# Patient Record
Sex: FEMALE | URBAN - METROPOLITAN AREA
[De-identification: names, ages, dates, MRNs, and addresses within clinical notes are randomized per-mention and may not be internally consistent; named-entity substitution may affect disease eponyms.]

---

## 2021-11-01 ENCOUNTER — OFFICE VISIT (OUTPATIENT)
Dept: URBAN - METROPOLITAN AREA CLINIC 1 | Facility: CLINIC | Age: 71
End: 2021-11-01
Payer: MEDICARE

## 2021-11-01 DIAGNOSIS — H04.123 TEAR FILM INSUFFICIENCY OF BILATERAL LACRIMAL GLANDS: ICD-10-CM

## 2021-11-01 DIAGNOSIS — H43.391 OTHER VITREOUS OPACITIES, RIGHT EYE: ICD-10-CM

## 2021-11-01 DIAGNOSIS — H25.13 AGE-RELATED NUCLEAR CATARACT, BILATERAL: Primary | ICD-10-CM

## 2021-11-01 DIAGNOSIS — H52.03 HYPERMETROPIA, BILATERAL: ICD-10-CM

## 2021-11-01 PROCEDURE — 92134 CPTRZ OPH DX IMG PST SGM RTA: CPT | Performed by: OPHTHALMOLOGY

## 2021-11-01 PROCEDURE — 99213 OFFICE O/P EST LOW 20 MIN: CPT | Performed by: OPHTHALMOLOGY

## 2021-11-01 ASSESSMENT — VISUAL ACUITY
OS: 20/40
OD: 20/25

## 2021-11-01 ASSESSMENT — INTRAOCULAR PRESSURE
OS: 16
OD: 13

## 2021-11-01 NOTE — IMPRESSION/PLAN
Impression: Tear film insufficiency of bilateral lacrimal glands: H04.123. Plan: Recommend use of high quality artificial tears 3-4x per day prn.

## 2021-11-01 NOTE — IMPRESSION/PLAN
Impression: Other vitreous opacities, right eye: H43.391. Plan: Discussed diagnosis in detail with patient. Discussed signs and symptoms of PVD/floaters. Discussed signs and symptoms of retinal detachment.

## 2022-05-12 ENCOUNTER — OFFICE VISIT (OUTPATIENT)
Dept: URBAN - METROPOLITAN AREA CLINIC 1 | Facility: CLINIC | Age: 72
End: 2022-05-12
Payer: MEDICARE

## 2022-05-12 DIAGNOSIS — H25.13 AGE-RELATED NUCLEAR CATARACT, BILATERAL: Primary | ICD-10-CM

## 2022-05-12 DIAGNOSIS — H04.123 TEAR FILM INSUFFICIENCY OF BILATERAL LACRIMAL GLANDS: ICD-10-CM

## 2022-05-12 PROCEDURE — 92286 ANT SGM IMG I&R SPECLR MIC: CPT | Performed by: OPHTHALMOLOGY

## 2022-05-12 PROCEDURE — 99214 OFFICE O/P EST MOD 30 MIN: CPT | Performed by: OPHTHALMOLOGY

## 2022-05-12 PROCEDURE — 92134 CPTRZ OPH DX IMG PST SGM RTA: CPT | Performed by: OPHTHALMOLOGY

## 2022-05-12 PROCEDURE — 76519 ECHO EXAM OF EYE: CPT | Performed by: OPHTHALMOLOGY

## 2022-05-12 ASSESSMENT — INTRAOCULAR PRESSURE
OS: 13
OD: 15

## 2022-05-12 NOTE — IMPRESSION/PLAN
Impression: Age-related nuclear cataract, bilateral: H25.13. Plan: Plan to perform cataract surgery in the right / left eye: Discussed the risks, benefits, and alternatives of cataract surgery. The patient stated a full understanding and a desire to proceed with the procedure. Biometry ordered for intraocular lens selection. Advised against driving until complete. Reviewed the increased risk of retinal detachment after cataract surgery and reviewed retinal detachment and general warnings and to contact us immediately should any of those develop. 

CHEST/LUNGS CLEARED ON TODAYS VISIT

DR Levon Chery WENT OVER SPECIALTY LENS FOR ASTIGMATISM, PATIENT UNDERSTOOD WILL LET US KNOW

OD 1ST EYE;      LENS; UPGRADE VIVITY TORIC; SURGERY DROPS; PLANO OU
Impression: Tear film insufficiency of bilateral lacrimal glands: H04.123. Plan: Dry eyes account for the patient's complaints. There is no evidence of permanent changes to the cornea. Explained that this condition does not have a cure. The options explained to the patient include topical lubricants. Patient advised not to use eye drops for red eye or allergies. A sample of Refresh given to patient. Patient to use OTC AT's tid to qid ou.
Applied

## 2022-07-19 ENCOUNTER — PROCEDURE (OUTPATIENT)
Dept: URBAN - METROPOLITAN AREA SURGERY 1 | Facility: SURGERY | Age: 72
End: 2022-07-19
Payer: MEDICARE

## 2022-07-19 ENCOUNTER — Encounter (OUTPATIENT)
Dept: URBAN - METROPOLITAN AREA SURGERY 2 | Facility: SURGERY | Age: 72
End: 2022-07-19
Payer: MEDICARE

## 2022-07-19 PROCEDURE — 66984 XCAPSL CTRC RMVL W/O ECP: CPT | Performed by: OPHTHALMOLOGY

## 2022-07-19 PROCEDURE — PR3FY PR3FY: CUSTOM | Performed by: OPHTHALMOLOGY

## 2022-07-20 ENCOUNTER — POST-OPERATIVE VISIT (OUTPATIENT)
Dept: URBAN - METROPOLITAN AREA CLINIC 1 | Facility: CLINIC | Age: 72
End: 2022-07-20
Payer: MEDICARE

## 2022-07-20 DIAGNOSIS — Z48.810 ENCOUNTER FOR SURGICAL AFTERCARE FOLLOWING SURGERY ON THE SENSE ORGANS: Primary | ICD-10-CM

## 2022-07-20 PROCEDURE — 99024 POSTOP FOLLOW-UP VISIT: CPT | Performed by: OPHTHALMOLOGY

## 2022-07-20 ASSESSMENT — INTRAOCULAR PRESSURE: OD: 12

## 2022-07-20 NOTE — IMPRESSION/PLAN
Impression: S/P Cataract Extraction by phacoemulsification with IOL placement; Femto (LenSx); ORA OD - 1 Day. Encounter for surgical aftercare following surgery on a sense organ  Z48.810. Plan: --Continue all meds--Advised patient to use artificial tears for comfort.

## 2022-07-26 ENCOUNTER — POST-OPERATIVE VISIT (OUTPATIENT)
Dept: URBAN - METROPOLITAN AREA CLINIC 3 | Facility: CLINIC | Age: 72
End: 2022-07-26
Payer: MEDICARE

## 2022-07-26 DIAGNOSIS — Z48.810 ENCOUNTER FOR SURGICAL AFTERCARE FOLLOWING SURGERY ON A SENSE ORGAN: Primary | ICD-10-CM

## 2022-07-26 ASSESSMENT — INTRAOCULAR PRESSURE
OD: 13
OS: 14

## 2022-07-26 NOTE — IMPRESSION/PLAN
Impression: S/P Cataract Extraction by phacoemulsification with IOL placement; Femto (LenSx); ORA OD - 7 Days. Encounter for surgical aftercare following surgery on a sense organ  Z48.810. Plan: OD healing well. Recommend sunglasses if light sensitivity experienced, while outdoors. Recommend +2.00 OTC reading glasses.  --Continue all meds

## 2022-08-01 ENCOUNTER — Encounter (OUTPATIENT)
Dept: URBAN - METROPOLITAN AREA SURGERY 2 | Facility: SURGERY | Age: 72
End: 2022-08-01
Payer: MEDICARE

## 2022-08-01 PROCEDURE — 66984 XCAPSL CTRC RMVL W/O ECP: CPT | Performed by: OPHTHALMOLOGY

## 2022-08-01 PROCEDURE — PR3FY PR3FY: CUSTOM | Performed by: OPHTHALMOLOGY

## 2022-08-02 ENCOUNTER — PROCEDURE (OUTPATIENT)
Dept: URBAN - METROPOLITAN AREA SURGERY 1 | Facility: SURGERY | Age: 72
End: 2022-08-02
Payer: MEDICARE

## 2022-08-03 ENCOUNTER — POST-OPERATIVE VISIT (OUTPATIENT)
Dept: URBAN - METROPOLITAN AREA CLINIC 1 | Facility: CLINIC | Age: 72
End: 2022-08-03
Payer: MEDICARE

## 2022-08-03 DIAGNOSIS — Z96.1 PRESENCE OF INTRAOCULAR LENS: Primary | ICD-10-CM

## 2022-08-03 PROCEDURE — 99024 POSTOP FOLLOW-UP VISIT: CPT | Performed by: OPHTHALMOLOGY

## 2022-08-03 ASSESSMENT — INTRAOCULAR PRESSURE: OS: 17

## 2022-08-03 NOTE — IMPRESSION/PLAN
Impression: S/P Cataract Extraction by phacoemulsification with IOL placement; Femto (LenSx); ORA OS - 1 Day. Presence of intraocular lens  Z96.1.  Plan: patient is doing well, just dryness to use at's qid ou 

rv 2 weeks --Continue all meds

## 2022-08-17 ENCOUNTER — POST-OPERATIVE VISIT (OUTPATIENT)
Dept: URBAN - METROPOLITAN AREA CLINIC 1 | Facility: CLINIC | Age: 72
End: 2022-08-17
Payer: MEDICARE

## 2022-08-17 DIAGNOSIS — Z96.1 PRESENCE OF INTRAOCULAR LENS: Primary | ICD-10-CM

## 2022-08-17 PROCEDURE — 99024 POSTOP FOLLOW-UP VISIT: CPT | Performed by: OPHTHALMOLOGY

## 2022-08-17 ASSESSMENT — INTRAOCULAR PRESSURE
OD: 12
OS: 15

## 2022-08-17 NOTE — IMPRESSION/PLAN
Impression: S/P Cataract Extraction by phacoemulsification with IOL placement; Femto (LenSx); ORA OS - 15 Days. Presence of intraocular lens  Z96.1. Plan: --Advised patient to use artificial tears PF  for comfort. --Continue Ketorolac 0.5% OS

## 2022-09-08 ENCOUNTER — POST-OPERATIVE VISIT (OUTPATIENT)
Dept: URBAN - METROPOLITAN AREA CLINIC 1 | Facility: CLINIC | Age: 72
End: 2022-09-08
Payer: MEDICARE

## 2022-09-08 DIAGNOSIS — Z96.1 PRESENCE OF INTRAOCULAR LENS: Primary | ICD-10-CM

## 2022-09-08 PROCEDURE — 99024 POSTOP FOLLOW-UP VISIT: CPT | Performed by: OPHTHALMOLOGY

## 2022-09-08 ASSESSMENT — VISUAL ACUITY
OD: 20/25
OS: 20/25

## 2022-09-08 ASSESSMENT — INTRAOCULAR PRESSURE
OS: 11
OD: 14

## 2022-09-08 NOTE — IMPRESSION/PLAN
Impression:  Presence of intraocular lens  Z96.1. Plan: patient is doing very good except on the dryness, she will continue with the at's qid ou  patient advised to use warm compress as well for 10-15 min also can think about seeing Dr. Bandar Ibarra as well.   

rv 6 mos CE no Test

## 2023-03-09 ENCOUNTER — OFFICE VISIT (OUTPATIENT)
Dept: URBAN - METROPOLITAN AREA CLINIC 1 | Facility: CLINIC | Age: 73
End: 2023-03-09
Payer: MEDICARE

## 2023-03-09 DIAGNOSIS — H04.123 TEAR FILM INSUFFICIENCY OF BILATERAL LACRIMAL GLANDS: ICD-10-CM

## 2023-03-09 DIAGNOSIS — H02.886 MGD OF LEFT EYE: ICD-10-CM

## 2023-03-09 DIAGNOSIS — Z96.1 PRESENCE OF INTRAOCULAR LENS: ICD-10-CM

## 2023-03-09 DIAGNOSIS — H02.883 MGD OF RIGHT EYE: ICD-10-CM

## 2023-03-09 DIAGNOSIS — H43.391 OTHER VITREOUS OPACITIES, RIGHT EYE: Primary | ICD-10-CM

## 2023-03-09 PROCEDURE — 99214 OFFICE O/P EST MOD 30 MIN: CPT | Performed by: OPHTHALMOLOGY

## 2023-03-09 RX ORDER — PREDNISOLONE ACETATE 10 MG/ML
1 % SUSPENSION/ DROPS OPHTHALMIC
Qty: 5 | Refills: 1 | Status: ACTIVE
Start: 2023-03-09

## 2023-03-09 ASSESSMENT — INTRAOCULAR PRESSURE
OD: 9
OS: 12

## 2023-03-09 NOTE — IMPRESSION/PLAN
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 Start: 1. Artificial tears QID OU 2. Prednisolone Taper 4-3-2-1 x 1 month 3. warm compresses and lid massages BID OU. Instructions given.

## 2023-03-09 NOTE — IMPRESSION/PLAN
Impression: MGD of right eye: H02.883. 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 Start: 1. Artificial tears QID OU 2. Prednisolone Taper 4-3-2-1 x 1 month 3. warm compresses and lid massages BID OU. Instructions given. 

F/U in 1 month

## 2023-04-12 ENCOUNTER — OFFICE VISIT (OUTPATIENT)
Dept: URBAN - METROPOLITAN AREA CLINIC 1 | Facility: CLINIC | Age: 73
End: 2023-04-12
Payer: MEDICARE

## 2023-04-12 DIAGNOSIS — H16.223 BILATERAL KERATOCONJUNCTIVITIS SICCA, NOT SPECIFIED AS SJOGREN'S: Primary | ICD-10-CM

## 2023-04-12 PROCEDURE — 68761 CLOSE TEAR DUCT OPENING: CPT | Performed by: OPHTHALMOLOGY

## 2023-04-12 PROCEDURE — 99213 OFFICE O/P EST LOW 20 MIN: CPT | Performed by: OPHTHALMOLOGY

## 2023-04-12 ASSESSMENT — INTRAOCULAR PRESSURE
OD: 16
OS: 14

## 2023-04-12 NOTE — IMPRESSION/PLAN
Impression: Bilateral keratoconjunctivitis sicca, not specified as Sjogren's: S16.215. Plan: Dry eyes account for the patient's complaints. There is no evidence of permanent changes to the cornea. Explained condition does not have a cure. The patient failed artificial tear monotherapy. Punctal plugs placed today in office, Lot #[id number]. Patient to continue AT's QID OU, Gel QHS OU and Pred QD OU until bottle is done.

## 2023-04-18 ENCOUNTER — POST-OPERATIVE VISIT (OUTPATIENT)
Dept: URBAN - METROPOLITAN AREA CLINIC 1 | Facility: CLINIC | Age: 73
End: 2023-04-18
Payer: MEDICARE

## 2023-04-18 DIAGNOSIS — H16.223 BILATERAL KERATOCONJUNCTIVITIS SICCA, NOT SPECIFIED AS SJOGREN'S: Primary | ICD-10-CM

## 2023-04-18 PROCEDURE — 99024 POSTOP FOLLOW-UP VISIT: CPT | Performed by: OPHTHALMOLOGY

## 2023-04-18 ASSESSMENT — INTRAOCULAR PRESSURE
OD: 16
OS: 16

## 2023-04-18 NOTE — IMPRESSION/PLAN
Impression: Bilateral keratoconjunctivitis sicca, not specified as Sjogren's: I57.097. Plan: Dry eyes account for the patient's complaints. There is no evidence of permanent changes to the cornea. Explained condition does not have a cure. The patient failed artificial tear monotherapy. Punctal plugs removed OU on today's visit, patient could not tolerate. Patient to use AT's BID OU, Prednisolone QD OU until bottle is done. Patient to do warm compresses/lid massages QD OU for maintenance.

## 2023-10-12 ENCOUNTER — OFFICE VISIT (OUTPATIENT)
Dept: URBAN - METROPOLITAN AREA CLINIC 1 | Facility: CLINIC | Age: 73
End: 2023-10-12
Payer: MEDICARE

## 2023-10-12 DIAGNOSIS — H02.883 MGD OF RIGHT EYE: ICD-10-CM

## 2023-10-12 DIAGNOSIS — H01.00B BLEPHARITIS OF LEFT EYE, UPPER AND LOWER EYELIDS: ICD-10-CM

## 2023-10-12 DIAGNOSIS — H16.223 BILATERAL KERATOCONJUNCTIVITIS SICCA, NOT SPECIFIED AS SJOGREN'S: Primary | ICD-10-CM

## 2023-10-12 DIAGNOSIS — Z96.1 PRESENCE OF INTRAOCULAR LENS: ICD-10-CM

## 2023-10-12 DIAGNOSIS — H01.00A BLEPHARITIS OF RIGHT EYE, UPPER AND LOWER EYELIDS: ICD-10-CM

## 2023-10-12 DIAGNOSIS — H02.886 MGD OF LEFT EYE: ICD-10-CM

## 2023-10-12 PROCEDURE — 99212 OFFICE O/P EST SF 10 MIN: CPT | Performed by: OPHTHALMOLOGY

## 2023-10-12 RX ORDER — PREDNISOLONE ACETATE 10 MG/ML
1 % SUSPENSION/ DROPS OPHTHALMIC
Qty: 5 | Refills: 1 | Status: ACTIVE
Start: 2023-10-12

## 2023-10-12 RX ORDER — PERFLUOROHEXYLOCTANE 1 MG/MG
100 % SOLUTION OPHTHALMIC
Qty: 2.5 | Refills: 3 | Status: ACTIVE
Start: 2023-10-12

## 2023-10-12 ASSESSMENT — INTRAOCULAR PRESSURE
OD: 17
OS: 15

## 2023-11-15 ENCOUNTER — OFFICE VISIT (OUTPATIENT)
Dept: URBAN - METROPOLITAN AREA CLINIC 1 | Facility: CLINIC | Age: 73
End: 2023-11-15
Payer: MEDICARE

## 2023-11-15 DIAGNOSIS — H02.886 MGD OF LEFT EYE: ICD-10-CM

## 2023-11-15 DIAGNOSIS — H02.883 MGD OF RIGHT EYE: ICD-10-CM

## 2023-11-15 DIAGNOSIS — H04.123 TEAR FILM INSUFFICIENCY OF BILATERAL LACRIMAL GLANDS: Primary | ICD-10-CM

## 2023-11-15 PROCEDURE — 99212 OFFICE O/P EST SF 10 MIN: CPT | Performed by: OPHTHALMOLOGY

## 2024-11-07 ENCOUNTER — OFFICE VISIT (OUTPATIENT)
Dept: URBAN - METROPOLITAN AREA CLINIC 1 | Facility: CLINIC | Age: 74
End: 2024-11-07
Payer: MEDICARE

## 2024-11-07 DIAGNOSIS — H01.00A BLEPHARITIS OF RIGHT EYE, UPPER AND LOWER EYELIDS: ICD-10-CM

## 2024-11-07 DIAGNOSIS — B88.0 OTHER ACARIASIS: Primary | ICD-10-CM

## 2024-11-07 DIAGNOSIS — Z96.1 PRESENCE OF INTRAOCULAR LENS: ICD-10-CM

## 2024-11-07 DIAGNOSIS — H04.123 TEAR FILM INSUFFICIENCY OF BILATERAL LACRIMAL GLANDS: ICD-10-CM

## 2024-11-07 DIAGNOSIS — H01.00B BLEPHARITIS OF LEFT EYE, UPPER AND LOWER EYELIDS: ICD-10-CM

## 2024-11-07 DIAGNOSIS — H43.391 OTHER VITREOUS OPACITIES, RIGHT EYE: ICD-10-CM

## 2024-11-07 PROCEDURE — 99213 OFFICE O/P EST LOW 20 MIN: CPT

## 2024-11-07 RX ORDER — LOTILANER OPHTHALMIC SOLUTION 2.5 MG/ML
0.25 % SOLUTION/ DROPS OPHTHALMIC
Qty: 5 | Refills: 1 | Status: INACTIVE
Start: 2024-11-07 | End: 2024-12-18

## 2024-11-07 ASSESSMENT — INTRAOCULAR PRESSURE
OS: 16
OD: 15

## 2024-12-19 ENCOUNTER — OFFICE VISIT (OUTPATIENT)
Dept: URBAN - METROPOLITAN AREA CLINIC 1 | Facility: CLINIC | Age: 74
End: 2024-12-19
Payer: MEDICARE

## 2024-12-19 DIAGNOSIS — H01.00B BLEPHARITIS OF LEFT EYE, UPPER AND LOWER EYELIDS: ICD-10-CM

## 2024-12-19 DIAGNOSIS — H04.123 TEAR FILM INSUFFICIENCY OF BILATERAL LACRIMAL GLANDS: Primary | ICD-10-CM

## 2024-12-19 DIAGNOSIS — H01.00A BLEPHARITIS OF RIGHT EYE, UPPER AND LOWER EYELIDS: ICD-10-CM

## 2024-12-19 PROCEDURE — 99212 OFFICE O/P EST SF 10 MIN: CPT

## 2024-12-19 RX ORDER — CYCLOSPORINE 0.5 MG/ML
0.05 % EMULSION OPHTHALMIC
Qty: 90 | Refills: 1 | Status: ACTIVE
Start: 2024-12-19

## 2024-12-19 ASSESSMENT — INTRAOCULAR PRESSURE
OS: 19
OD: 19

## 2025-03-05 ENCOUNTER — OFFICE VISIT (OUTPATIENT)
Dept: URBAN - METROPOLITAN AREA CLINIC 1 | Facility: CLINIC | Age: 75
End: 2025-03-05
Payer: MEDICARE

## 2025-03-05 DIAGNOSIS — H04.123 TEAR FILM INSUFFICIENCY OF BILATERAL LACRIMAL GLANDS: ICD-10-CM

## 2025-03-05 DIAGNOSIS — H02.883 MGD OF RIGHT EYE: ICD-10-CM

## 2025-03-05 DIAGNOSIS — H02.886 MGD OF LEFT EYE: ICD-10-CM

## 2025-03-05 DIAGNOSIS — H01.00A BLEPHARITIS OF RIGHT EYE, UPPER AND LOWER EYELIDS: Primary | ICD-10-CM

## 2025-03-05 DIAGNOSIS — H01.00B BLEPHARITIS OF LEFT EYE, UPPER AND LOWER EYELIDS: ICD-10-CM
